# Patient Record
Sex: MALE | Race: WHITE | Employment: OTHER | URBAN - METROPOLITAN AREA
[De-identification: names, ages, dates, MRNs, and addresses within clinical notes are randomized per-mention and may not be internally consistent; named-entity substitution may affect disease eponyms.]

---

## 2022-04-10 ENCOUNTER — APPOINTMENT (OUTPATIENT)
Dept: GENERAL RADIOLOGY | Age: 47
End: 2022-04-10
Payer: MEDICARE

## 2022-04-10 ENCOUNTER — HOSPITAL ENCOUNTER (EMERGENCY)
Age: 47
Discharge: HOME | End: 2022-04-10
Attending: EMERGENCY MEDICINE
Payer: MEDICARE

## 2022-04-10 VITALS
TEMPERATURE: 99.4 F | SYSTOLIC BLOOD PRESSURE: 145 MMHG | BODY MASS INDEX: 31.4 KG/M2 | DIASTOLIC BLOOD PRESSURE: 83 MMHG | HEART RATE: 100 BPM | RESPIRATION RATE: 20 BRPM | HEIGHT: 72 IN | WEIGHT: 231.8 LBS | OXYGEN SATURATION: 98 %

## 2022-04-10 DIAGNOSIS — S82.831A CLOSED AVULSION FRACTURE OF DISTAL END OF RIGHT FIBULA, INITIAL ENCOUNTER: Primary | ICD-10-CM

## 2022-04-10 PROCEDURE — 99283 EMERGENCY DEPT VISIT LOW MDM: CPT

## 2022-04-10 PROCEDURE — 73590 X-RAY EXAM OF LOWER LEG: CPT

## 2022-04-10 PROCEDURE — 73610 X-RAY EXAM OF ANKLE: CPT

## 2022-04-11 ENCOUNTER — TELEPHONE (OUTPATIENT)
Dept: ORTHOPEDIC SURGERY | Age: 47
End: 2022-04-11

## 2022-04-11 NOTE — TELEPHONE ENCOUNTER
Left message at number provided for Markeldemetrio Cuello (per voicemail it belongs to his mother). Upon return call, he may be seen in office this week. OK to overbook if patient prefers to be seen in [place] on 4/13/22.

## 2022-04-11 NOTE — ED NOTES
pt brought by [facility]. EMS reports pt having right ankle pain after stumbling this afternoon about 1530. mom here and states pt has autism. right outer ankle pain noted (cannot rate). mild ankle swelling noted. no OTC pain meds taken.     walked to stretcher with a limp    EMS VS  Pulse 109  O2 sat 100%   EMS didn't take BP \"because they were just around the corner\"   Blanca Juarez RN  04/10/22 2118       Blanca Juarez [facility] [address]  04/10/22 2120 Biopsy Type: H and E

## 2022-04-11 NOTE — ED NOTES
Juan, paramedic, fitted and applied walking boot for right foot. Teaching for home use. Pt states feels better with boot on.    Hurts \"just a little bit\"     Marion Sim RN  04/10/22 2121

## 2022-04-11 NOTE — ED PROVIDER NOTES
eMERGENCY dEPARTMENT eNCOUnter        CHIEF COMPLAINT    Ankle right pain and injury    [facility]    Lucia Enriquez is a 52 y.o. male who presents to the ER for evaluation mechanical fall and injury, complained of right lateral malleoli tenderness,    REVIEW OF SYSTEMS    See HPI for further details. Review of systems otherwise negative. PAST MEDICAL HISTORY    Past Medical History:   Diagnosis Date    Autism        SURGICAL HISTORY    Past Surgical History:   Procedure Laterality Date    EYE SURGERY         CURRENT MEDICATIONS        ALLERGIES    No Known Allergies    FAMILY HISTORY    History reviewed. No pertinent family history. SOCIAL HISTORY    Social History     Socioeconomic History    Marital status: Single     Spouse name: None    Number of children: None    Years of education: None    Highest education level: None   Occupational History    None   Tobacco Use    Smoking status: Never Smoker    Smokeless tobacco: Never Used   Substance and Sexual Activity    Alcohol use: No    Drug use: No    Sexual activity: None   Other Topics Concern    None   Social History Narrative    None     Social Determinants of Health     Financial Resource Strain:     Difficulty of Paying Living Expenses: Not on file   Food Insecurity:     Worried About Running Out of Food in the Last Year: Not on file    Zackary of Food in the Last Year: Not on file   Transportation Needs:     Lack of Transportation (Medical): Not on file    Lack of Transportation (Non-Medical):  Not on file   Physical Activity:     Days of Exercise per Week: Not on file    Minutes of Exercise per Session: Not on file   Stress:     Feeling of Stress : Not on file   Social Connections:     Frequency of Communication with Friends and Family: Not on file    Frequency of Social Gatherings with Friends and Family: Not on file    Attends Gnosticist Services: Not on file    Active Member of Clubs or Organizations: Not on file    Attends Club or Organization Meetings: Not on file    Marital Status: Not on file   Intimate Partner Violence:     Fear of Current or Ex-Partner: Not on file    Emotionally Abused: Not on file    Physically Abused: Not on file    Sexually Abused: Not on file   Housing Stability:     Unable to Pay for Housing in the Last Year: Not on file    Number of [place] in the Last Year: Not on file    Unstable Housing in the Last Year: Not on file       PHYSICAL EXAM    VITAL SIGNS: BP (!) 145/83   Pulse 100   Temp 99.4 °F (37.4 °C) (Oral)   Resp 20   Ht 6' (1.829 m)   Wt 231 lb 12.8 oz (105.1 kg)   SpO2 98%   BMI 31.44 kg/m²   Constitutional:  Well developed, well nourished, no acute distress, non-toxic appearance   HENT:  Atraumatic, external ears normal, nose normal, oropharynx moist.  Neck- normal range of motion, no tenderness, supple   Respiratory:  No respiratory distress, normal breath sounds. Cardiovascular:  Normal rate, normal rhythm, no murmurs, no gallops, no rubs   GI:  Soft, nondistended, normal bowel sounds, nontender   Musculoskeletal: Right lateral malleoli tenderness and swelling, no pulse deficit no proximal tib-fib tenderness  Integument:  Well hydrated, no rash   Neurologic: Developmental delay, autism, no other sensory or motor deficit    RADIOLOGY/PROCEDURES    Right ankle right: Nondisplaced lateral malleoli fracture    ED COURSE & MEDICAL DECISION MAKING    Pertinent Labs & Imaging studies reviewed. (See chart for details)  walking boot, Tylenol Motrin as needed for pain, outpatient follow-up with orthopedics and return if worse or new symptoms    FINAL IMPRESSION    1. Right lateral malleoli fracture nondisplaced  2.           Ashkan Haines MD  44/20/23 0145

## 2022-04-13 ENCOUNTER — OFFICE VISIT (OUTPATIENT)
Dept: ORTHOPEDIC SURGERY | Age: 47
End: 2022-04-13
Payer: MEDICARE

## 2022-04-13 DIAGNOSIS — S82.61XA CLOSED FRACTURE OF DISTAL LATERAL MALLEOLUS OF RIGHT FIBULA, INITIAL ENCOUNTER: Primary | ICD-10-CM

## 2022-04-13 PROCEDURE — 27786 TREATMENT OF ANKLE FRACTURE: CPT | Performed by: ORTHOPAEDIC SURGERY

## 2022-04-13 PROCEDURE — 99203 OFFICE O/P NEW LOW 30 MIN: CPT | Performed by: ORTHOPAEDIC SURGERY

## 2022-04-13 NOTE — PROGRESS NOTES
CHIEF COMPLAINT: Right ankle pain / lateral malleolus Orellana A fracture. DATE OF INJURY: 4/10/2022, DOT 4/13/2022    HISTORY:  Mr. Emma Shah 52 y.o.  male with Autism presents today with his Mom for the first visit for evaluation of a right ankle injury which occurred when he was walking and tripped. He was first seen and evaluated in [facility] , where he was x-rayed, splinted and asked to f/u with Orthopedics. He is complaining of lateral ankle pain and swelling. This is better with elevation and worse with bearing any wt. The pain is sharp and not radiating. No numbness or tingling sensation. Alleviating factors: elevation and rest. No other complaint. Past Medical History:   Diagnosis Date    Autism        Past Surgical History:   Procedure Laterality Date    EYE SURGERY         Social History     Socioeconomic History    Marital status: Single     Spouse name: Not on file    Number of children: Not on file    Years of education: Not on file    Highest education level: Not on file   Occupational History    Not on file   Tobacco Use    Smoking status: Never Smoker    Smokeless tobacco: Never Used   Substance and Sexual Activity    Alcohol use: No    Drug use: No    Sexual activity: Not on file   Other Topics Concern    Not on file   Social History Narrative    Not on file     Social Determinants of Health     Financial Resource Strain:     Difficulty of Paying Living Expenses: Not on file   Food Insecurity:     Worried About Running Out of Food in the Last Year: Not on file    Zackary of Food in the Last Year: Not on file   Transportation Needs:     Lack of Transportation (Medical): Not on file    Lack of Transportation (Non-Medical):  Not on file   Physical Activity:     Days of Exercise per Week: Not on file    Minutes of Exercise per Session: Not on file   Stress:     Feeling of Stress : Not on file   Social Connections:     Frequency of Communication with Friends and Family: Not on file    Frequency of Social Gatherings with Friends and Family: Not on file    Attends Yarsani Services: Not on file    Active Member of Clubs or Organizations: Not on file    Attends Club or Organization Meetings: Not on file    Marital Status: Not on file   Intimate Partner Violence:     Fear of Current or Ex-Partner: Not on file    Emotionally Abused: Not on file    Physically Abused: Not on file    Sexually Abused: Not on file   Housing Stability:     Unable to Pay for Housing in the Last Year: Not on file    Number of [place] in the Last Year: Not on file    Unstable Housing in the Last Year: Not on file       No family history on file. No current outpatient medications on file prior to visit. No current facility-administered medications on file prior to visit. Pertinent items are noted in HPI  Review of systems reviewed from Patient History Form and available in the patient's chart under the Media tab. PHYSICAL EXAMINATION:  Mr. Nelson Garcia is a very pleasant 52 y.o.  male who presents today in no acute distress, awake, alert, and oriented. He is well dressed, nourished and  groomed. Patient with normal affect. Height is   , weight is  , There is no height or weight on file to calculate BMI. Resting respiratory rate is 16. Examination of the gait, showed that the patient walks with a crutch, WB right leg and in a boot. Examination of both ankles showing a decreased range of motion of the right ankle compare to the other side. There is moderate swelling that can be seen, as well as ecchymosis. He has intact sensation and good pedal pulses. He has significant tenderness on deep palpation over the lateral malleolus of the right ankle. IMAGING: Xrays, 3 views of the right ankle dated 4/10/2022 from [facility],  were reviewed, and showed a minimally displaced lateral malleolus Orellana A fracture.       IMPRESSION: Right ankle minimally displaced lateral malleolus Orellana A fracture. PLAN:  I discussed that the overall alignment of this fracture is good and that we can try to treat this non-operatively in a boot WBAT and ROM. We discussed the risk of nonunion and or malunion. We re-applied a boot today in the office and instructed him  in care. We will see him  back in 6 weeks at which time we will get a new xray of the right ankle.           Dionicia Frankel, MD

## 2022-04-26 ENCOUNTER — OFFICE VISIT (OUTPATIENT)
Dept: INTERNAL MEDICINE CLINIC | Age: 47
End: 2022-04-26
Payer: MEDICARE

## 2022-04-26 VITALS
WEIGHT: 232.38 LBS | DIASTOLIC BLOOD PRESSURE: 78 MMHG | HEART RATE: 99 BPM | TEMPERATURE: 98.4 F | SYSTOLIC BLOOD PRESSURE: 110 MMHG | BODY MASS INDEX: 31.52 KG/M2 | OXYGEN SATURATION: 97 %

## 2022-04-26 DIAGNOSIS — Z00.00 PREVENTATIVE HEALTH CARE: ICD-10-CM

## 2022-04-26 DIAGNOSIS — Z13.29 SCREENING FOR THYROID DISORDER: ICD-10-CM

## 2022-04-26 DIAGNOSIS — F84.0 AUTISM DISORDER: ICD-10-CM

## 2022-04-26 DIAGNOSIS — Z13.220 SCREENING FOR LIPID DISORDERS: ICD-10-CM

## 2022-04-26 DIAGNOSIS — Z12.11 COLON CANCER SCREENING: ICD-10-CM

## 2022-04-26 DIAGNOSIS — Z76.89 ENCOUNTER TO ESTABLISH CARE: Primary | ICD-10-CM

## 2022-04-26 PROCEDURE — 1036F TOBACCO NON-USER: CPT | Performed by: NURSE PRACTITIONER

## 2022-04-26 PROCEDURE — G8417 CALC BMI ABV UP PARAM F/U: HCPCS | Performed by: NURSE PRACTITIONER

## 2022-04-26 PROCEDURE — 99203 OFFICE O/P NEW LOW 30 MIN: CPT | Performed by: NURSE PRACTITIONER

## 2022-04-26 PROCEDURE — G8427 DOCREV CUR MEDS BY ELIG CLIN: HCPCS | Performed by: NURSE PRACTITIONER

## 2022-04-26 ASSESSMENT — ENCOUNTER SYMPTOMS
CONSTIPATION: 0
VOMITING: 0
DIARRHEA: 0
SHORTNESS OF BREATH: 0
NAUSEA: 0
BLOOD IN STOOL: 0
ABDOMINAL PAIN: 0
COUGH: 0

## 2022-04-26 ASSESSMENT — PATIENT HEALTH QUESTIONNAIRE - PHQ9
SUM OF ALL RESPONSES TO PHQ QUESTIONS 1-9: 0
1. LITTLE INTEREST OR PLEASURE IN DOING THINGS: 0
SUM OF ALL RESPONSES TO PHQ QUESTIONS 1-9: 0
2. FEELING DOWN, DEPRESSED OR HOPELESS: 0
SUM OF ALL RESPONSES TO PHQ9 QUESTIONS 1 & 2: 0

## 2022-04-26 NOTE — PROGRESS NOTES
Date: 4/26/2022                                               Subjective/Objective:     Chief Complaint   Patient presents with   Mamadou Burton Establish Care       HPI     Rosanna Romano is a 51 yo male, visit today to establish care. Former patient of Dr Nathalia Mae. Patient has autism, is accompanied by his mother today. He suffered a right ankle fracture, saw orthopedics 4/13/2022. Orhopedics recommended non operative management with WBAT in a boot, follow up in 6 weeks. Denies concerns. Needs paperwork completed for camp. Vaccinations: COVID-completed two doses  Colon Cancer Screening: needs  Prostate Cancer Screening: N/A age 48                 There are no problems to display for this patient.       Past Medical History:   Diagnosis Date    Autism     Autism     Closed right ankle fracture        Past Surgical History:   Procedure Laterality Date    EYE SURGERY         Admission on 03/22/2018, Discharged on 03/22/2018   Component Date Value Ref Range Status    WBC 03/22/2018 10.8  4.0 - 11.0 K/uL Final    RBC 03/22/2018 5.17  4.20 - 5.90 M/uL Final    Hemoglobin 03/22/2018 16.4  13.5 - 17.5 g/dL Final    Hematocrit 03/22/2018 48.6  40.5 - 52.5 % Final    MCV 03/22/2018 93.9  80.0 - 100.0 fL Final    MCH 03/22/2018 31.8  26.0 - 34.0 pg Final    MCHC 03/22/2018 33.8  31.0 - 36.0 g/dL Final    RDW 03/22/2018 13.3  12.4 - 15.4 % Final    Platelets 27/05/2617 168  135 - 450 K/uL Corrected    no platelet clumps seen; platelet estimate good 03/22/2018  21:57    MPV 03/22/2018 8.4  5.0 - 10.5 fL Final    Neutrophils % 03/22/2018 84.8  % Final    Lymphocytes % 03/22/2018 6.4  % Final    Monocytes % 03/22/2018 8.3  % Final    Eosinophils % 03/22/2018 0.3  % Final    Basophils % 03/22/2018 0.2  % Final    Neutrophils Absolute 03/22/2018 9.2* 1.7 - 7.7 K/uL Final    Lymphocytes Absolute 03/22/2018 0.7* 1.0 - 5.1 K/uL Final    Monocytes Absolute 03/22/2018 0.9  0.0 - 1.3 K/uL Final    Eosinophils Absolute 03/22/2018 0.0  0.0 - 0.6 K/uL Final    Basophils Absolute 03/22/2018 0.0  0.0 - 0.2 K/uL Final    Sodium 03/22/2018 141  136 - 145 mmol/L Final    Potassium reflex Magnesium 03/22/2018 3.6  3.5 - 5.1 mmol/L Final    Chloride 03/22/2018 101  99 - 110 mmol/L Final    CO2 03/22/2018 21  21 - 32 mmol/L Final    Anion Gap 03/22/2018 19* 3 - 16 Final    Glucose 03/22/2018 147* 70 - 99 mg/dL Final    BUN 03/22/2018 13  7 - 20 mg/dL Final    CREATININE 03/22/2018 0.7* 0.9 - 1.3 mg/dL Final    GFR Non- 03/22/2018 >60  >60 Final    Comment: >60 mL/min/1.73m2 EGFR, calc. for ages 25 and older using the  MDRD formula (not corrected for weight), is valid for stable  renal function.  GFR  03/22/2018 >60  >60 Final    Comment: Chronic Kidney Disease: less than 60 ml/min/1.73 sq.m. Kidney Failure: less than 15 ml/min/1.73 sq.m. Results valid for patients 18 years and older.       Calcium 03/22/2018 8.7  8.3 - 10.6 mg/dL Final    Total Protein 03/22/2018 7.3  6.4 - 8.2 g/dL Final    Albumin 03/22/2018 4.3  3.4 - 5.0 g/dL Final    Albumin/Globulin Ratio 03/22/2018 1.4  1.1 - 2.2 Final    Total Bilirubin 03/22/2018 0.3  0.0 - 1.0 mg/dL Final    Alkaline Phosphatase 03/22/2018 76  40 - 129 U/L Final    ALT 03/22/2018 17  10 - 40 U/L Final    AST 03/22/2018 15  15 - 37 U/L Final    Globulin 03/22/2018 3.0  g/dL Final    Lipase 03/22/2018 25.0  13.0 - 60.0 U/L Final    Color, UA 03/22/2018 Yellow  Straw/Yellow Final    Clarity, UA 03/22/2018 Clear  Clear Final    Glucose, Ur 03/22/2018 Negative  Negative mg/dL Final    Bilirubin Urine 03/22/2018 Negative  Negative Final    Ketones, Urine 03/22/2018 TRACE* Negative mg/dL Final    Specific Gravity, UA 03/22/2018 1.015  1.005 - 1.030 Final    Blood, Urine 03/22/2018 TRACE-INTACT* Negative Final    pH, UA 03/22/2018 6.0  5.0 - 8.0 Final    Protein, UA 03/22/2018 TRACE* Negative mg/dL Final    Urobilinogen, Urine 03/22/2018 1.0  <2.0 E.U./dL Final    Nitrite, Urine 03/22/2018 Negative  Negative Final    Leukocyte Esterase, Urine 03/22/2018 Negative  Negative Final    Microscopic Examination 03/22/2018 YES   Final    Urine Reflex to Culture 03/22/2018 Not Indicated   Final    Urine Type 03/22/2018 Not Specified   Final    Troponin 03/22/2018 <0.01  <0.01 ng/mL Final    Methodology by Troponin T    Ventricular Rate 03/22/2018 86  BPM Final    Atrial Rate 03/22/2018 86  BPM Final    P-R Interval 03/22/2018 160  ms Final    QRS Duration 03/22/2018 92  ms Final    Q-T Interval 03/22/2018 368  ms Final    QTc Calculation (Bazett) 03/22/2018 440  ms Final    P Axis 03/22/2018 50  degrees Final    R Axis 03/22/2018 3  degrees Final    T Axis 03/22/2018 24  degrees Final    Diagnosis 03/22/2018    Final                    Value:Normal sinus rhythm  Cannot rule out Inferior infarct , age undetermined  Abnormal ECG  No previous ECGs available  Confirmed by JENI ARMENDARIZ MD ([id number]) on 3/23/2018 9:11:45 AM      Mucus, UA 03/22/2018 1+* /LPF Final    WBC, UA 03/22/2018 0-2  0 - 5 /HPF Final    RBC, UA 03/22/2018 0-2  0 - 2 /HPF Final    Epithelial Cells, UA 03/22/2018 0-2  /HPF Final    Trichomonas, UA 03/22/2018 None Seen  /HPF Final       History reviewed. No pertinent family history. No current outpatient medications on file. No current facility-administered medications for this visit. No Known Allergies    Review of Systems   Constitutional: Negative for chills and fever. Respiratory: Negative for cough and shortness of breath. Cardiovascular: Negative for chest pain and leg swelling. Gastrointestinal: Negative for abdominal pain, blood in stool, constipation, diarrhea, nausea and vomiting. Genitourinary: Negative. Negative for difficulty urinating. Musculoskeletal:        Right ankle fracture   Neurological: Negative for dizziness.    Psychiatric/Behavioral: Negative for dysphoric mood and sleep disturbance. Vitals:  /78 (Site: Left Upper Arm, Position: Sitting, Cuff Size: Large Adult)   Pulse 99   Temp 98.4 °F (36.9 °C) (Oral)   Wt 232 lb 6 oz (105.4 kg)   SpO2 97%   BMI 31.52 kg/m²     Physical Exam  Vitals reviewed. Constitutional:       General: He is not in acute distress. Appearance: Normal appearance. HENT:      Head: Normocephalic and atraumatic. Right Ear: Ear canal and external ear normal. Tympanic membrane is scarred. Left Ear: Ear canal and external ear normal. Tympanic membrane is scarred. Cardiovascular:      Rate and Rhythm: Normal rate and regular rhythm. Heart sounds: Normal heart sounds. Pulmonary:      Effort: Pulmonary effort is normal. No respiratory distress. Breath sounds: Normal breath sounds. No wheezing. Abdominal:      General: Bowel sounds are normal. There is no distension. Palpations: Abdomen is soft. Musculoskeletal:         General: Signs of injury present. Comments: Right ankle boot   Skin:     General: Skin is warm and dry. Neurological:      General: No focal deficit present. Mental Status: He is alert and oriented to person, place, and time. Psychiatric:         Thought Content: Thought content normal.         Judgment: Judgment normal.         Assessment/Plan     1. Encounter to establish care    2. Autism disorder: camp forms completed    3. Screening for lipid disorders  - Lipid, Fasting; Future    4. Screening for thyroid disorder  - TSH with Reflex; Future    5. Colon cancer screening  - Fecal DNA Colorectal cancer screening (Cologuard)    6. Preventative health care  - Declines Tdap vaccination  - CBC with Auto Differential; Future  - Comprehensive Metabolic Panel;  Future      Orders Placed This Encounter   Procedures    Fecal DNA Colorectal cancer screening (Cologuard)    CBC with Auto Differential     Standing Status:   Future     Standing Expiration Date:   4/26/2023   [facility] Comprehensive Metabolic Panel     Standing Status:   Future     Standing Expiration Date:   4/26/2023    TSH with Reflex     Standing Status:   Future     Standing Expiration Date:   4/26/2023    Lipid, Fasting     Standing Status:   Future     Standing Expiration Date:   4/26/2023       Return in about 1 year (around 4/26/2023). OR sooner with questions, concerns, worsening symptoms    MICHAEL BARNARD  4/26/2022  3:12 PM    Discussed use, benefit, and side effects of prescribed medications. Barriers to medication compliance addressed. Discussed all ordered testing and labs. All patient questions answered. Patient agreeable with plan above. Please note that this chart was generated using dragon dictation software. Although every effort was made to ensure the accuracy of this automated transcription, some errors in transcription may have occurred.

## 2022-04-26 NOTE — PATIENT INSTRUCTIONS
Schedule COVID booster with pharmacy   Cologuard stool testing, if you do not receive this please let us know  Fasting blood work

## 2022-05-03 ENCOUNTER — TELEPHONE (OUTPATIENT)
Dept: ORTHOPEDIC SURGERY | Age: 47
End: 2022-05-03

## 2022-05-03 NOTE — TELEPHONE ENCOUNTER
Patient's mother is needing to find out if [facility] covers xrays. Instructed her that Yahaira Rm should be covered under fracture care for 90 days, but stated that I do not handle billing directly and suggest speaking with the insurance provider directly to see what their insurance covers. She states that she did, but they were unhelpful. I explained that I could provide a phone number for [facility] billing department, but don't think they are the appropriate party to help answer this either. Suggested waiting and day or two and calling the insurance again and trying to speak with someone else.

## 2022-05-03 NOTE — TELEPHONE ENCOUNTER
General Question     Subject: Patient's Mother is requesting a call. Her son is autistic and she has questions regarding his appt being covered.   Patient Mother: Candace Gonzales Number: [phone]

## 2022-05-09 ENCOUNTER — NURSE ONLY (OUTPATIENT)
Dept: INTERNAL MEDICINE CLINIC | Age: 47
End: 2022-05-09
Payer: MEDICARE

## 2022-05-09 DIAGNOSIS — Z13.220 SCREENING FOR LIPID DISORDERS: ICD-10-CM

## 2022-05-09 DIAGNOSIS — Z00.00 PREVENTATIVE HEALTH CARE: ICD-10-CM

## 2022-05-09 DIAGNOSIS — Z13.29 SCREENING FOR THYROID DISORDER: ICD-10-CM

## 2022-05-09 LAB
A/G RATIO: 1.9 (ref 1.1–2.2)
ALBUMIN SERPL-MCNC: 4.6 G/DL (ref 3.4–5)
ALP BLD-CCNC: 92 U/L (ref 40–129)
ALT SERPL-CCNC: 23 U/L (ref 10–40)
ANION GAP SERPL CALCULATED.3IONS-SCNC: 15 MMOL/L (ref 3–16)
AST SERPL-CCNC: 15 U/L (ref 15–37)
BASOPHILS ABSOLUTE: 0 K/UL (ref 0–0.2)
BASOPHILS RELATIVE PERCENT: 0.5 %
BILIRUB SERPL-MCNC: 0.4 MG/DL (ref 0–1)
BUN BLDV-MCNC: 14 MG/DL (ref 7–20)
CALCIUM SERPL-MCNC: 9.3 MG/DL (ref 8.3–10.6)
CHLORIDE BLD-SCNC: 100 MMOL/L (ref 99–110)
CHOLESTEROL, FASTING: 175 MG/DL (ref 0–199)
CO2: 25 MMOL/L (ref 21–32)
CREAT SERPL-MCNC: 0.9 MG/DL (ref 0.9–1.3)
EOSINOPHILS ABSOLUTE: 0.1 K/UL (ref 0–0.6)
EOSINOPHILS RELATIVE PERCENT: 1.2 %
GFR AFRICAN AMERICAN: >60
GFR NON-AFRICAN AMERICAN: >60
GLUCOSE BLD-MCNC: 97 MG/DL (ref 70–99)
HCT VFR BLD CALC: 48.6 % (ref 40.5–52.5)
HDLC SERPL-MCNC: 42 MG/DL (ref 40–60)
HEMOGLOBIN: 16.2 G/DL (ref 13.5–17.5)
LDL CHOLESTEROL CALCULATED: 116 MG/DL
LYMPHOCYTES ABSOLUTE: 1.6 K/UL (ref 1–5.1)
LYMPHOCYTES RELATIVE PERCENT: 28.2 %
MCH RBC QN AUTO: 31.1 PG (ref 26–34)
MCHC RBC AUTO-ENTMCNC: 33.4 G/DL (ref 31–36)
MCV RBC AUTO: 93.3 FL (ref 80–100)
MONOCYTES ABSOLUTE: 0.7 K/UL (ref 0–1.3)
MONOCYTES RELATIVE PERCENT: 11.8 %
NEUTROPHILS ABSOLUTE: 3.4 K/UL (ref 1.7–7.7)
NEUTROPHILS RELATIVE PERCENT: 58.3 %
PDW BLD-RTO: 13.4 % (ref 12.4–15.4)
PLATELET # BLD: 202 K/UL (ref 135–450)
PMV BLD AUTO: 8.3 FL (ref 5–10.5)
POTASSIUM SERPL-SCNC: 4.5 MMOL/L (ref 3.5–5.1)
RBC # BLD: 5.21 M/UL (ref 4.2–5.9)
SODIUM BLD-SCNC: 140 MMOL/L (ref 136–145)
TOTAL PROTEIN: 7 G/DL (ref 6.4–8.2)
TRIGLYCERIDE, FASTING: 87 MG/DL (ref 0–150)
TSH REFLEX: 3.07 UIU/ML (ref 0.27–4.2)
VLDLC SERPL CALC-MCNC: 17 MG/DL
WBC # BLD: 5.8 K/UL (ref 4–11)

## 2022-05-09 PROCEDURE — 36415 COLL VENOUS BLD VENIPUNCTURE: CPT | Performed by: NURSE PRACTITIONER

## 2022-05-27 ENCOUNTER — OFFICE VISIT (OUTPATIENT)
Dept: ORTHOPEDIC SURGERY | Age: 47
End: 2022-05-27

## 2022-05-27 VITALS — WEIGHT: 232 LBS | BODY MASS INDEX: 31.42 KG/M2 | HEIGHT: 72 IN

## 2022-05-27 DIAGNOSIS — S82.61XA CLOSED FRACTURE OF DISTAL LATERAL MALLEOLUS OF RIGHT FIBULA, INITIAL ENCOUNTER: Primary | ICD-10-CM

## 2022-05-27 PROCEDURE — 99024 POSTOP FOLLOW-UP VISIT: CPT | Performed by: ORTHOPAEDIC SURGERY

## 2022-05-28 PROBLEM — S82.61XA CLOSED FRACTURE OF DISTAL LATERAL MALLEOLUS OF RIGHT ANKLE: Status: ACTIVE | Noted: 2022-05-28

## 2022-05-28 NOTE — PROGRESS NOTES
CHIEF COMPLAINT: Right ankle pain / lateral malleolus Orellana A fracture. DATE OF INJURY: 4/10/2022, DOT 4/13/2022    HISTORY:  Mr. Lorrayne Moritz 52 y.o.  male with Autism presents today with his Mom for f/u evaluation of a right ankle injury which occurred when he was walking and tripped. He was first seen and evaluated in [facility] , where he was x-rayed, splinted and asked to f/u with Orthopedics. He reported no more lateral ankle pain and swelling 0/10. This is better with elevation and worse with bearing any wt. The pain is sharp and not radiating. No numbness or tingling sensation. Alleviating factors: elevation and rest. No other complaint. Past Medical History:   Diagnosis Date    Autism     Autism     Closed right ankle fracture        Past Surgical History:   Procedure Laterality Date    EYE SURGERY         Social History     Socioeconomic History    Marital status: Single     Spouse name: Not on file    Number of children: Not on file    Years of education: Not on file    Highest education level: Not on file   Occupational History    Not on file   Tobacco Use    Smoking status: Never Smoker    Smokeless tobacco: Never Used   Substance and Sexual Activity    Alcohol use: No    Drug use: No    Sexual activity: Not on file   Other Topics Concern    Not on file   Social History Narrative    Not on file     Social Determinants of Health     Financial Resource Strain:     Difficulty of Paying Living Expenses: Not on file   Food Insecurity:     Worried About Running Out of Food in the Last Year: Not on file    Zackary of Food in the Last Year: Not on file   Transportation Needs:     Lack of Transportation (Medical): Not on file    Lack of Transportation (Non-Medical):  Not on file   Physical Activity:     Days of Exercise per Week: Not on file    Minutes of Exercise per Session: Not on file   Stress:     Feeling of Stress : Not on file   Social Connections:     Frequency of Communication with Friends and Family: Not on file    Frequency of Social Gatherings with Friends and Family: Not on file    Attends Rastafarian Services: Not on file    Active Member of Clubs or Organizations: Not on file    Attends Club or Organization Meetings: Not on file    Marital Status: Not on file   Intimate Partner Violence:     Fear of Current or Ex-Partner: Not on file    Emotionally Abused: Not on file    Physically Abused: Not on file    Sexually Abused: Not on file   Housing Stability:     Unable to Pay for Housing in the Last Year: Not on file    Number of [place] in the Last Year: Not on file    Unstable Housing in the Last Year: Not on file       No family history on file. No current outpatient medications on file prior to visit. No current facility-administered medications on file prior to visit. Pertinent items are noted in HPI  Review of systems reviewed from Patient History Form and available in the patient's chart under the Media tab. PHYSICAL EXAMINATION:  Mr. Zka Nolan is a very pleasant 52 y.o.  male who presents today in no acute distress, awake, alert, and oriented. He is well dressed, nourished and  groomed. Patient with normal affect. Height is  6' (1.829 m), weight is 232 lb (105.2 kg), Body mass index is 31.46 kg/m². Resting respiratory rate is 16. Examination of the gait, showed that the patient walks with a boot, WB right leg. Examination of both ankles showing a good range of motion of the right ankle compare to the other side. There is minimal swelling that can be seen, no ecchymosis. He has intact sensation and good pedal pulses. He has no tenderness on deep palpation over the lateral malleolus of the right ankle. IMAGING: Xrays, 3 views of the right ankle taken today in the office,  were reviewed, and showed a minimally displaced lateral malleolus Orellana A fracture.       IMPRESSION: Right ankle minimally displaced lateral malleolus Orellana A fracture. PLAN:  I discussed that the overall alignment of this fracture is still good and healing well. He will be WBAT in the boot, and start aggressive ROM and peroneal strengthening exercise. Off the boot in 1-2 weeks. The patient will come back for a follow up in 2 months. At that time, we will take 3 views of the right ankle standing.        Katarzyna Rosario MD

## 2022-08-10 ENCOUNTER — OFFICE VISIT (OUTPATIENT)
Dept: ORTHOPEDIC SURGERY | Age: 47
End: 2022-08-10
Payer: MEDICARE

## 2022-08-10 VITALS — HEIGHT: 72 IN | BODY MASS INDEX: 31.42 KG/M2 | WEIGHT: 232 LBS

## 2022-08-10 DIAGNOSIS — S82.61XA CLOSED FRACTURE OF DISTAL LATERAL MALLEOLUS OF RIGHT FIBULA, INITIAL ENCOUNTER: Primary | ICD-10-CM

## 2022-08-10 DIAGNOSIS — S82.61XD CLOSED FRACTURE OF DISTAL LATERAL MALLEOLUS OF RIGHT FIBULA WITH ROUTINE HEALING, SUBSEQUENT ENCOUNTER: ICD-10-CM

## 2022-08-10 PROCEDURE — 99213 OFFICE O/P EST LOW 20 MIN: CPT | Performed by: ORTHOPAEDIC SURGERY

## 2022-08-10 PROCEDURE — G8417 CALC BMI ABV UP PARAM F/U: HCPCS | Performed by: ORTHOPAEDIC SURGERY

## 2022-08-10 PROCEDURE — G8427 DOCREV CUR MEDS BY ELIG CLIN: HCPCS | Performed by: ORTHOPAEDIC SURGERY

## 2022-08-10 PROCEDURE — 1036F TOBACCO NON-USER: CPT | Performed by: ORTHOPAEDIC SURGERY

## 2022-08-10 NOTE — PROGRESS NOTES
today in no acute distress, awake, alert, and oriented. He is well dressed, nourished and  groomed. Patient with normal affect. Height is  6' (1.829 m), weight is 232 lb (105.2 kg), Body mass index is 31.46 kg/m². Resting respiratory rate is 16. Examination of the gait, showed that the patient walks with no limp, WB right leg. Examination of both ankles showing a good range of motion of the right ankle compare to the other side. There is minimal to no swelling that can be seen, no ecchymosis. He has intact sensation and good pedal pulses. He has no tenderness on deep palpation over the lateral malleolus of the right ankle. Ankle reflex 1+ bilaterally. Good strength, and no instability both upper and lower extremities. IMAGING: Xrays, 3 views of the right ankle taken today in the office,  were reviewed, and showed a minimally displaced lateral malleolus Orellana A fracture, healing well. IMPRESSION: Right ankle minimally displaced lateral malleolus Orellana A fracture. PLAN: He can go back to normal activity with no restrictions. He will be seen PRN. I told the patient that it is not unusual to have some achy pain and swelling for up to a year after a fracture.       Jasbir Irizarry MD

## 2023-01-27 ENCOUNTER — OFFICE VISIT (OUTPATIENT)
Dept: INTERNAL MEDICINE CLINIC | Age: 48
End: 2023-01-27

## 2023-01-27 VITALS
BODY MASS INDEX: 29.29 KG/M2 | OXYGEN SATURATION: 96 % | TEMPERATURE: 98 F | WEIGHT: 216 LBS | DIASTOLIC BLOOD PRESSURE: 78 MMHG | SYSTOLIC BLOOD PRESSURE: 116 MMHG | HEART RATE: 84 BPM

## 2023-01-27 DIAGNOSIS — Z02.89 PHYSICAL EXAM FOR CAMP: Primary | ICD-10-CM

## 2023-01-27 DIAGNOSIS — L71.9 ROSACEA: ICD-10-CM

## 2023-01-27 SDOH — ECONOMIC STABILITY: FOOD INSECURITY: WITHIN THE PAST 12 MONTHS, YOU WORRIED THAT YOUR FOOD WOULD RUN OUT BEFORE YOU GOT MONEY TO BUY MORE.: NEVER TRUE

## 2023-01-27 SDOH — ECONOMIC STABILITY: FOOD INSECURITY: WITHIN THE PAST 12 MONTHS, THE FOOD YOU BOUGHT JUST DIDN'T LAST AND YOU DIDN'T HAVE MONEY TO GET MORE.: NEVER TRUE

## 2023-01-27 ASSESSMENT — PATIENT HEALTH QUESTIONNAIRE - PHQ9
2. FEELING DOWN, DEPRESSED OR HOPELESS: 0
SUM OF ALL RESPONSES TO PHQ QUESTIONS 1-9: 0
SUM OF ALL RESPONSES TO PHQ9 QUESTIONS 1 & 2: 0
SUM OF ALL RESPONSES TO PHQ QUESTIONS 1-9: 0
1. LITTLE INTEREST OR PLEASURE IN DOING THINGS: 0

## 2023-01-27 ASSESSMENT — ENCOUNTER SYMPTOMS
CONSTIPATION: 0
COUGH: 0
SHORTNESS OF BREATH: 0

## 2023-01-27 ASSESSMENT — SOCIAL DETERMINANTS OF HEALTH (SDOH): HOW HARD IS IT FOR YOU TO PAY FOR THE VERY BASICS LIKE FOOD, HOUSING, MEDICAL CARE, AND HEATING?: NOT HARD AT ALL

## 2023-01-27 NOTE — PROGRESS NOTES
Date: 1/27/2023                                               Subjective/Objective:     Chief Complaint   Patient presents with    Forms     Needs paperwork filled out for [facility]       HPI    Denice Kan is a 51 yo male, visit today for physical. Needs form completed for [employer]. He has autism, is accompanied by his mother today. Patient and mother both deny concerns. Patient reports that he feels well overall, feels that his mood is doing well. He enjoys watching TV and looking at the window. He looks forward to attending [employer] in the summer. He does have rosacea that is flared by certain foods. Mom feels like she is managing it well however would like to see dermatology.          Patient Active Problem List    Diagnosis Date Noted    Physical exam for [facility] 01/27/2023    Closed fracture of distal lateral malleolus of right ankle 05/28/2022       Past Medical History:   Diagnosis Date    Autism     Autism     Closed right ankle fracture        Past Surgical History:   Procedure Laterality Date    EYE SURGERY         Nurse Only on 05/09/2022   Component Date Value Ref Range Status    Cholesterol, Fasting 05/09/2022 175  0 - 199 mg/dL Final    Triglyceride, Fasting 05/09/2022 87  0 - 150 mg/dL Final    HDL 05/09/2022 42  40 - 60 mg/dL Final    LDL Calculated 05/09/2022 116 (A)  <100 mg/dL Final    VLDL Cholesterol Calculated 05/09/2022 17  Not Established mg/dL Final    TSH 05/09/2022 3.07  0.27 - 4.20 uIU/mL Final    Sodium 05/09/2022 140  136 - 145 mmol/L Final    Potassium 05/09/2022 4.5  3.5 - 5.1 mmol/L Final    Chloride 05/09/2022 100  99 - 110 mmol/L Final    CO2 05/09/2022 25  21 - 32 mmol/L Final    Anion Gap 05/09/2022 15  3 - 16 Final    Glucose 05/09/2022 97  70 - 99 mg/dL Final    BUN 05/09/2022 14  7 - 20 mg/dL Final    Creatinine 05/09/2022 0.9  0.9 - 1.3 mg/dL Final    GFR Non- 05/09/2022 >60  >60 Final    Comment: >60 mL/min/1.73m2 EGFR, calc. for ages 25 and older using the  MDRD formula (not corrected for weight), is valid for stable  renal function. GFR  05/09/2022 >60  >60 Final    Comment: Chronic Kidney Disease: less than 60 ml/min/1.73 sq.m. Kidney Failure: less than 15 ml/min/1.73 sq.m. Results valid for patients 18 years and older. Calcium 05/09/2022 9.3  8.3 - 10.6 mg/dL Final    Total Protein 05/09/2022 7.0  6.4 - 8.2 g/dL Final    Albumin 05/09/2022 4.6  3.4 - 5.0 g/dL Final    Albumin/Globulin Ratio 05/09/2022 1.9  1.1 - 2.2 Final    Total Bilirubin 05/09/2022 0.4  0.0 - 1.0 mg/dL Final    Alkaline Phosphatase 05/09/2022 92  40 - 129 U/L Final    ALT 05/09/2022 23  10 - 40 U/L Final    AST 05/09/2022 15  15 - 37 U/L Final    WBC 05/09/2022 5.8  4.0 - 11.0 K/uL Final    RBC 05/09/2022 5.21  4.20 - 5.90 M/uL Final    Hemoglobin 05/09/2022 16.2  13.5 - 17.5 g/dL Final    Hematocrit 05/09/2022 48.6  40.5 - 52.5 % Final    MCV 05/09/2022 93.3  80.0 - 100.0 fL Final    MCH 05/09/2022 31.1  26.0 - 34.0 pg Final    MCHC 05/09/2022 33.4  31.0 - 36.0 g/dL Final    RDW 05/09/2022 13.4  12.4 - 15.4 % Final    Platelets 69/46/8094 202  135 - 450 K/uL Final    MPV 05/09/2022 8.3  5.0 - 10.5 fL Final    Neutrophils % 05/09/2022 58.3  % Final    Lymphocytes % 05/09/2022 28.2  % Final    Monocytes % 05/09/2022 11.8  % Final    Eosinophils % 05/09/2022 1.2  % Final    Basophils % 05/09/2022 0.5  % Final    Neutrophils Absolute 05/09/2022 3.4  1.7 - 7.7 K/uL Final    Lymphocytes Absolute 05/09/2022 1.6  1.0 - 5.1 K/uL Final    Monocytes Absolute 05/09/2022 0.7  0.0 - 1.3 K/uL Final    Eosinophils Absolute 05/09/2022 0.1  0.0 - 0.6 K/uL Final    Basophils Absolute 05/09/2022 0.0  0.0 - 0.2 K/uL Final       No family history on file. No current outpatient medications on file. No current facility-administered medications for this visit. No Known Allergies    Review of Systems   Constitutional:  Negative for chills and fever.    Respiratory: Negative for cough and shortness of breath. Cardiovascular:  Negative for chest pain and leg swelling. Gastrointestinal:  Negative for constipation. Genitourinary:  Negative for difficulty urinating. Musculoskeletal: Negative. Neurological:  Negative for dizziness. Psychiatric/Behavioral:  Negative for behavioral problems, decreased concentration and sleep disturbance. Vitals:  /78 (Site: Left Upper Arm, Position: Sitting, Cuff Size: Large Adult)   Pulse 84   Temp 98 °F (36.7 °C) (Oral)   Wt 216 lb (98 kg)   SpO2 96%   BMI 29.29 kg/m²     Physical Exam  Vitals reviewed. Constitutional:       General: He is not in acute distress. HENT:      Head: Normocephalic and atraumatic. Right Ear: Tympanic membrane, ear canal and external ear normal.      Left Ear: Tympanic membrane, ear canal and external ear normal.   Cardiovascular:      Rate and Rhythm: Normal rate and regular rhythm. Heart sounds: Normal heart sounds. Pulmonary:      Breath sounds: Normal breath sounds. Abdominal:      General: Bowel sounds are normal. There is no distension. Palpations: Abdomen is soft. Tenderness: There is no abdominal tenderness. Musculoskeletal:      Right lower leg: No edema. Left lower leg: No edema. Skin:     General: Skin is warm and dry. Neurological:      General: No focal deficit present. Mental Status: He is alert and oriented to person, place, and time. Psychiatric:         Behavior: Behavior normal.       Assessment/Plan     1. Physical exam for [facility]: form completed    2.  Chalo Gutierrez MD, Leslee Toledo    Orders Placed This Encounter   Procedures    [facility] Jensen MD, Leslee Toledo     Referral Priority:   Routine     Referral Type:   Eval and Treat     Referral Reason:   Specialty Services Required     Referred to Provider:   Christiano Wells MD     Requested Specialty:   Dermatology     Number of Visits Requested:   1       No follow-ups on file. OR sooner with questions, concerns, worsening symptoms    RADHA WALLACEAPPLE, MICHAEL  1/27/2023  5:10 PM    Discussed use, benefit, and side effects of prescribed medications. Barriers to medication compliance addressed. Discussed all ordered testing and labs. All patient questions answered. Patient agreeable with plan above. Please note that this chart was generated using dragon dictation software. Although every effort was made to ensure the accuracy of this automated transcription, some errors in transcription may have occurred.

## 2023-02-10 ENCOUNTER — TELEPHONE (OUTPATIENT)
Dept: INTERNAL MEDICINE CLINIC | Age: 48
End: 2023-02-10

## 2023-02-10 NOTE — TELEPHONE ENCOUNTER
Does not meet criteria for Paxlovid.    Take tylenol/ibuprofen as needed, drink plenty of fluids  Any weakness, trouble breathing or unable to keep fluids down go to ER

## 2023-02-10 NOTE — TELEPHONE ENCOUNTER
Test positive for Covid on 02-, his symptoms fever,  chills, , these symptoms started 09-. What can they do or take for this.       Allergies- Sushil nails's [phone]

## 2023-05-23 ENCOUNTER — TELEPHONE (OUTPATIENT)
Dept: INTERNAL MEDICINE CLINIC | Age: 48
End: 2023-05-23

## 2023-05-23 DIAGNOSIS — Z12.11 COLON CANCER SCREENING: Primary | ICD-10-CM

## 2023-05-23 NOTE — TELEPHONE ENCOUNTER
Please let pt mom know that pt needs colon cancer screening. I have ordered cologuard, at home stool testing.  If there is any family history of colon cancer I need to know as he should not do home testing and would need to have colonoscopy      Mom has been informed of Addie's message anov and she is not interested in having Kathrine Solders go through either the cologuard or colonoscopy

## 2023-08-11 ENCOUNTER — TELEPHONE (OUTPATIENT)
Dept: PRIMARY CARE CLINIC | Age: 48
End: 2023-08-11

## 2023-08-11 NOTE — TELEPHONE ENCOUNTER
Called pt about cologuard kit in the mail. Caregiver answered and said she cancelled the order a while ago. Please advise.

## 2024-07-22 DIAGNOSIS — L71.9 ROSACEA: Primary | ICD-10-CM

## 2025-03-10 ENCOUNTER — OFFICE VISIT (OUTPATIENT)
Dept: INTERNAL MEDICINE CLINIC | Age: 50
End: 2025-03-10
Payer: MEDICARE

## 2025-03-10 VITALS
OXYGEN SATURATION: 96 % | BODY MASS INDEX: 29.12 KG/M2 | HEIGHT: 72 IN | DIASTOLIC BLOOD PRESSURE: 74 MMHG | HEART RATE: 94 BPM | WEIGHT: 215 LBS | SYSTOLIC BLOOD PRESSURE: 120 MMHG

## 2025-03-10 DIAGNOSIS — L71.9 ROSACEA: Primary | ICD-10-CM

## 2025-03-10 DIAGNOSIS — Z12.5 PROSTATE CANCER SCREENING: ICD-10-CM

## 2025-03-10 DIAGNOSIS — E78.00 PURE HYPERCHOLESTEROLEMIA: ICD-10-CM

## 2025-03-10 DIAGNOSIS — Z12.11 COLON CANCER SCREENING: ICD-10-CM

## 2025-03-10 PROCEDURE — 3017F COLORECTAL CA SCREEN DOC REV: CPT | Performed by: NURSE PRACTITIONER

## 2025-03-10 PROCEDURE — 36415 COLL VENOUS BLD VENIPUNCTURE: CPT | Performed by: NURSE PRACTITIONER

## 2025-03-10 PROCEDURE — 1036F TOBACCO NON-USER: CPT | Performed by: NURSE PRACTITIONER

## 2025-03-10 PROCEDURE — G8419 CALC BMI OUT NRM PARAM NOF/U: HCPCS | Performed by: NURSE PRACTITIONER

## 2025-03-10 PROCEDURE — 99214 OFFICE O/P EST MOD 30 MIN: CPT | Performed by: NURSE PRACTITIONER

## 2025-03-10 PROCEDURE — G8427 DOCREV CUR MEDS BY ELIG CLIN: HCPCS | Performed by: NURSE PRACTITIONER

## 2025-03-10 RX ORDER — METRONIDAZOLE 7.5 MG/G
GEL TOPICAL 2 TIMES DAILY
Qty: 45 G | Refills: 0 | Status: SHIPPED | OUTPATIENT
Start: 2025-03-10

## 2025-03-10 SDOH — ECONOMIC STABILITY: FOOD INSECURITY: WITHIN THE PAST 12 MONTHS, YOU WORRIED THAT YOUR FOOD WOULD RUN OUT BEFORE YOU GOT MONEY TO BUY MORE.: PATIENT DECLINED

## 2025-03-10 SDOH — ECONOMIC STABILITY: FOOD INSECURITY: WITHIN THE PAST 12 MONTHS, THE FOOD YOU BOUGHT JUST DIDN'T LAST AND YOU DIDN'T HAVE MONEY TO GET MORE.: PATIENT DECLINED

## 2025-03-10 ASSESSMENT — ENCOUNTER SYMPTOMS
SHORTNESS OF BREATH: 0
ROS SKIN COMMENTS: FACE

## 2025-03-10 ASSESSMENT — PATIENT HEALTH QUESTIONNAIRE - PHQ9
SUM OF ALL RESPONSES TO PHQ QUESTIONS 1-9: 0
2. FEELING DOWN, DEPRESSED OR HOPELESS: NOT AT ALL
SUM OF ALL RESPONSES TO PHQ QUESTIONS 1-9: 0
1. LITTLE INTEREST OR PLEASURE IN DOING THINGS: NOT AT ALL
SUM OF ALL RESPONSES TO PHQ QUESTIONS 1-9: 0
SUM OF ALL RESPONSES TO PHQ QUESTIONS 1-9: 0

## 2025-03-10 NOTE — PROGRESS NOTES
Date: 3/10/2025                                               Subjective/Objective:     Chief Complaint   Patient presents with    Skin Problem       HPI    Jeison Reese is a 51 yo male, visit today for rosacea. He has autism, is accompanied by his mother today.     Rosacea - seems to have food triggers (citrus). Was referred to dermatology previously - has not seen.  Ate pizza sauce about a week ago and flared this.   Not itching or painful.      Colon Cancer Screening: needs  Prostate Cancer Screening: needs            Patient Active Problem List    Diagnosis Date Noted    Physical exam for [facility] 01/27/2023    Closed fracture of distal lateral malleolus of right ankle 05/28/2022    Pure hypercholesterolemia 03/10/2025       Past Medical History:   Diagnosis Date    Autism     Autism     Closed right ankle fracture        Past Surgical History:   Procedure Laterality Date    EYE SURGERY         Nurse Only on 05/09/2022   Component Date Value Ref Range Status    Cholesterol, Fasting 05/09/2022 175  0 - 199 mg/dL Final    Triglyceride, Fasting 05/09/2022 87  0 - 150 mg/dL Final    HDL 05/09/2022 42  40 - 60 mg/dL Final    LDL Calculated 05/09/2022 116 (H)  <100 mg/dL Final    VLDL Cholesterol Calculated 05/09/2022 17  Not Established mg/dL Final    TSH 05/09/2022 3.07  0.27 - 4.20 uIU/mL Final    Sodium 05/09/2022 140  136 - 145 mmol/L Final    Potassium 05/09/2022 4.5  3.5 - 5.1 mmol/L Final    Chloride 05/09/2022 100  99 - 110 mmol/L Final    CO2 05/09/2022 25  21 - 32 mmol/L Final    Anion Gap 05/09/2022 15  3 - 16 Final    Glucose 05/09/2022 97  70 - 99 mg/dL Final    BUN 05/09/2022 14  7 - 20 mg/dL Final    Creatinine 05/09/2022 0.9  0.9 - 1.3 mg/dL Final    GFR Non- 05/09/2022 >60  >60 Final    Comment: >60 mL/min/1.73m2 EGFR, calc. for ages 18 and older using the  MDRD formula (not corrected for weight), is valid for stable  renal function.      GFR  05/09/2022 >60  >60

## 2025-03-10 NOTE — PATIENT INSTRUCTIONS
Wash face with plain cleanser (such as store brand cetaphil) and apply metrogel - thin layer - twice a day. Avoid the eyes.  See dermatologist   Cologuard stool testing for colon cancer screening

## 2025-03-11 ENCOUNTER — RESULTS FOLLOW-UP (OUTPATIENT)
Dept: INTERNAL MEDICINE CLINIC | Age: 50
End: 2025-03-11

## 2025-03-11 LAB
ALBUMIN SERPL-MCNC: 4.2 G/DL (ref 3.4–5)
ALP SERPL-CCNC: 88 U/L (ref 40–129)
ALT SERPL-CCNC: 31 U/L (ref 10–40)
ANION GAP SERPL CALCULATED.3IONS-SCNC: 11 MMOL/L (ref 3–16)
AST SERPL-CCNC: 19 U/L (ref 15–37)
BILIRUB DIRECT SERPL-MCNC: <0.1 MG/DL (ref 0–0.3)
BILIRUB INDIRECT SERPL-MCNC: 0.2 MG/DL (ref 0–1)
BILIRUB SERPL-MCNC: 0.3 MG/DL (ref 0–1)
BUN SERPL-MCNC: 17 MG/DL (ref 7–20)
CALCIUM SERPL-MCNC: 9.2 MG/DL (ref 8.3–10.6)
CHLORIDE SERPL-SCNC: 103 MMOL/L (ref 99–110)
CHOLEST SERPL-MCNC: 207 MG/DL (ref 0–199)
CO2 SERPL-SCNC: 25 MMOL/L (ref 21–32)
CREAT SERPL-MCNC: 0.9 MG/DL (ref 0.9–1.3)
GFR SERPLBLD CREATININE-BSD FMLA CKD-EPI: >90 ML/MIN/{1.73_M2}
GLUCOSE SERPL-MCNC: 104 MG/DL (ref 70–99)
HDLC SERPL-MCNC: 41 MG/DL (ref 40–60)
LDLC SERPL CALC-MCNC: 130 MG/DL
POTASSIUM SERPL-SCNC: 4.5 MMOL/L (ref 3.5–5.1)
PROT SERPL-MCNC: 6.4 G/DL (ref 6.4–8.2)
PSA SERPL DL<=0.01 NG/ML-MCNC: 1.11 NG/ML (ref 0–4)
SODIUM SERPL-SCNC: 139 MMOL/L (ref 136–145)
TRIGL SERPL-MCNC: 180 MG/DL (ref 0–150)
VLDLC SERPL CALC-MCNC: 36 MG/DL